# Patient Record
Sex: FEMALE | Race: WHITE | NOT HISPANIC OR LATINO
[De-identification: names, ages, dates, MRNs, and addresses within clinical notes are randomized per-mention and may not be internally consistent; named-entity substitution may affect disease eponyms.]

---

## 2017-09-06 ENCOUNTER — RESULT REVIEW (OUTPATIENT)
Age: 47
End: 2017-09-06

## 2019-05-15 DIAGNOSIS — Z12.31 ENCOUNTER FOR SCREENING MAMMOGRAM FOR MALIGNANT NEOPLASM OF BREAST: ICD-10-CM

## 2019-05-15 PROBLEM — Z00.00 ENCOUNTER FOR PREVENTIVE HEALTH EXAMINATION: Status: ACTIVE | Noted: 2019-05-15

## 2019-08-05 ENCOUNTER — RESULT REVIEW (OUTPATIENT)
Age: 49
End: 2019-08-05

## 2019-08-08 DIAGNOSIS — N63.0 UNSPECIFIED LUMP IN UNSPECIFIED BREAST: ICD-10-CM

## 2019-08-21 ENCOUNTER — RESULT REVIEW (OUTPATIENT)
Age: 49
End: 2019-08-21

## 2019-10-01 ENCOUNTER — RESULT REVIEW (OUTPATIENT)
Age: 49
End: 2019-10-01

## 2019-10-03 ENCOUNTER — RECORD ABSTRACTING (OUTPATIENT)
Age: 49
End: 2019-10-03

## 2019-10-03 DIAGNOSIS — N63.0 UNSPECIFIED LUMP IN UNSPECIFIED BREAST: ICD-10-CM

## 2019-10-03 DIAGNOSIS — Z72.89 OTHER PROBLEMS RELATED TO LIFESTYLE: ICD-10-CM

## 2019-10-03 DIAGNOSIS — Z83.3 FAMILY HISTORY OF DIABETES MELLITUS: ICD-10-CM

## 2019-10-03 DIAGNOSIS — Z80.1 FAMILY HISTORY OF MALIGNANT NEOPLASM OF TRACHEA, BRONCHUS AND LUNG: ICD-10-CM

## 2019-10-03 DIAGNOSIS — C44.90 UNSPECIFIED MALIGNANT NEOPLASM OF SKIN, UNSPECIFIED: ICD-10-CM

## 2019-10-03 DIAGNOSIS — Z82.49 FAMILY HISTORY OF ISCHEMIC HEART DISEASE AND OTHER DISEASES OF THE CIRCULATORY SYSTEM: ICD-10-CM

## 2019-10-03 DIAGNOSIS — Z80.0 FAMILY HISTORY OF MALIGNANT NEOPLASM OF DIGESTIVE ORGANS: ICD-10-CM

## 2019-10-03 DIAGNOSIS — Z85.828 PERSONAL HISTORY OF OTHER MALIGNANT NEOPLASM OF SKIN: ICD-10-CM

## 2019-10-03 DIAGNOSIS — Z80.8 FAMILY HISTORY OF MALIGNANT NEOPLASM OF OTHER ORGANS OR SYSTEMS: ICD-10-CM

## 2019-10-03 LAB — CYTOLOGY CVX/VAG DOC THIN PREP: NORMAL

## 2019-12-11 ENCOUNTER — APPOINTMENT (OUTPATIENT)
Dept: OBGYN | Facility: CLINIC | Age: 49
End: 2019-12-11
Payer: COMMERCIAL

## 2019-12-11 VITALS
SYSTOLIC BLOOD PRESSURE: 112 MMHG | DIASTOLIC BLOOD PRESSURE: 70 MMHG | BODY MASS INDEX: 22.02 KG/M2 | HEIGHT: 64 IN | WEIGHT: 129 LBS

## 2019-12-11 PROCEDURE — 99396 PREV VISIT EST AGE 40-64: CPT

## 2020-01-02 LAB
CYTOLOGY CVX/VAG DOC THIN PREP: NORMAL
HPV HIGH+LOW RISK DNA PNL CVX: NOT DETECTED

## 2020-08-13 ENCOUNTER — APPOINTMENT (OUTPATIENT)
Dept: OBGYN | Facility: CLINIC | Age: 50
End: 2020-08-13
Payer: COMMERCIAL

## 2020-08-13 VITALS
BODY MASS INDEX: 24.59 KG/M2 | DIASTOLIC BLOOD PRESSURE: 72 MMHG | SYSTOLIC BLOOD PRESSURE: 114 MMHG | WEIGHT: 144 LBS | HEIGHT: 64 IN | TEMPERATURE: 99 F

## 2020-08-13 DIAGNOSIS — Z30.431 ENCOUNTER FOR ROUTINE CHECKING OF INTRAUTERINE CONTRACEPTIVE DEVICE: ICD-10-CM

## 2020-08-13 PROCEDURE — 81025 URINE PREGNANCY TEST: CPT

## 2020-08-13 PROCEDURE — 58300 INSERT INTRAUTERINE DEVICE: CPT

## 2020-08-13 PROCEDURE — 58301 REMOVE INTRAUTERINE DEVICE: CPT

## 2020-08-14 LAB
HCG UR QL: NEGATIVE
QUALITY CONTROL: YES

## 2021-10-11 ENCOUNTER — APPOINTMENT (OUTPATIENT)
Dept: OBGYN | Facility: CLINIC | Age: 51
End: 2021-10-11
Payer: COMMERCIAL

## 2021-10-11 VITALS
WEIGHT: 140 LBS | DIASTOLIC BLOOD PRESSURE: 62 MMHG | BODY MASS INDEX: 23.9 KG/M2 | HEIGHT: 64 IN | SYSTOLIC BLOOD PRESSURE: 100 MMHG

## 2021-10-11 DIAGNOSIS — N83.202 UNSPECIFIED OVARIAN CYST, LEFT SIDE: ICD-10-CM

## 2021-10-11 PROCEDURE — 99396 PREV VISIT EST AGE 40-64: CPT

## 2021-10-11 NOTE — HISTORY OF PRESENT ILLNESS
[TextBox_4] : 50 year old pt for annual exam. No complaints today. Using Mirena IUD for contraception and happy with method; does not get periods, only inconsistent spotting. Denies changes in health since last exam. Due for mammogram. Up to date with colonoscopy. No history of cervical dysplasia. Sexually active without problems. Notes history of possible dermoid cyst that hasn't been imaged in a long time, but had episode of severe pain 2 years ago and she is afraid of recurrence. 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

## 2021-10-11 NOTE — PLAN
[FreeTextEntry1] : Rx mammogram and breast US given. Will send for TVUS to evaluate adnexa. Cervix ca screening due 2024.

## 2023-01-10 ENCOUNTER — RESULT REVIEW (OUTPATIENT)
Age: 53
End: 2023-01-10

## 2023-01-13 ENCOUNTER — NON-APPOINTMENT (OUTPATIENT)
Age: 53
End: 2023-01-13

## 2023-02-09 ENCOUNTER — RESULT REVIEW (OUTPATIENT)
Age: 53
End: 2023-02-09

## 2023-02-10 DIAGNOSIS — N64.89 OTHER SPECIFIED DISORDERS OF BREAST: ICD-10-CM

## 2023-03-07 ENCOUNTER — RESULT REVIEW (OUTPATIENT)
Age: 53
End: 2023-03-07

## 2023-03-26 ENCOUNTER — TRANSCRIPTION ENCOUNTER (OUTPATIENT)
Age: 53
End: 2023-03-26

## 2023-03-27 ENCOUNTER — NON-APPOINTMENT (OUTPATIENT)
Age: 53
End: 2023-03-27

## 2023-03-27 ENCOUNTER — APPOINTMENT (OUTPATIENT)
Dept: BREAST CENTER | Facility: CLINIC | Age: 53
End: 2023-03-27
Payer: COMMERCIAL

## 2023-03-27 DIAGNOSIS — R92.8 OTHER ABNORMAL AND INCONCLUSIVE FINDINGS ON DIAGNOSTIC IMAGING OF BREAST: ICD-10-CM

## 2023-03-27 PROCEDURE — 99204 OFFICE O/P NEW MOD 45 MIN: CPT

## 2023-03-27 NOTE — PHYSICAL EXAM
[Normocephalic] : normocephalic [Atraumatic] : atraumatic [EOMI] : extra ocular movement intact [Supple] : supple [No Supraclavicular Adenopathy] : no supraclavicular adenopathy [No Cervical Adenopathy] : no cervical adenopathy [Examined in the supine and seated position] : examined in the supine and seated position [No dominant masses] : no dominant masses in right breast  [No dominant masses] : no dominant masses left breast [No Nipple Retraction] : no left nipple retraction [No Nipple Discharge] : no left nipple discharge [Breast Mass Right Breast ___cm] : no masses [Breast Mass Left Breast ___cm] : no masses [Breast Nipple Inversion] : nipples not inverted [Breast Nipple Retraction] : nipples not retracted [Breast Nipple Flattening] : nipples not flattened [Breast Nipple Fissures] : nipples not fissured [Breast Abnormal Lactation (Galactorrhea)] : no galactorrhea [Breast Abnormal Secretion Bloody Fluid] : no bloody discharge [Breast Abnormal Secretion Serous Fluid] : no serous discharge [Breast Abnormal Secretion Opalescent Fluid] : no milky discharge [No Axillary Lymphadenopathy] : no left axillary lymphadenopathy [No Edema] : no edema [No Rashes] : no rashes [No Ulceration] : no ulceration [de-identified] : On exam, the patient has ptotic full C-cup breasts.  On palpation, she has fairly dense fibroglandular changes bilaterally with some thickening in the upper outer aspect of the right breast but no suspicious masses.  She has no axillary, supraclavicular, or cervical adenopathy.

## 2023-03-27 NOTE — ASSESSMENT
[FreeTextEntry1] : The patient is a 52-year-old G3, P3 perimenopausal white female of [place], Niuean, Slovenian, English descent.  She underwent menarche at age 14 and had her first child at age 27.  She has no family history of breast or ovarian cancer.  Her father had colon cancer in his late 50s and her mother had lung cancer at age 67.  The patient does have a history of prior breast cyst aspirations.  She underwent her routine mammography and ultrasound on January 10, 2023 and was found to have some architectural distortion seen in the posterior upper outer aspect of the right breast.  Diagnostic right breast mammography on February 9, 2023 showed this to be persistent measuring about 1.2 x 1.1 cm.  Targeted ultrasound at that time showed no ultrasound correlate.  She then underwent a right breast stereotactic core biopsy on March 7, 2023 with a "stoplight" clip placed at the biopsy site and the pathology just came back with fibroadenomatoid changes which was felt to be discordant.  I reviewed all her imaging which was performed at [place] which indeed showed this area some architectural distortion best seen on the lateral view of the right breast.  Appropriate biopsy was performed which was benign.  There is definitely some discordance with this biopsy and I agree with recommendation to move forward with surgical excision.  She understands the need for a localization device to be placed preoperatively in order to remove the area.  On my view, this is likely going to be benign but the patient understands the slight risk that this is upgraded to an early malignancy.  The patient understands the procedure and agrees to proceed as planned and I will go ahead and get this scheduled with a localization clip to be placed preoperatively.  The patient and her  were present for the discussion and all their questions were answered.

## 2023-03-27 NOTE — HISTORY OF PRESENT ILLNESS
[FreeTextEntry1] : The patient is a 52-year-old G3, P3 perimenopausal white female of [place], Rwandan, Italian, English descent.  She underwent menarche at age 14 and had her first child at age 27.  She has no family history of breast or ovarian cancer.  Her father had colon cancer in his late 50s and her mother had lung cancer at age 67.  The patient does have a history of prior breast cyst aspirations.  She underwent her routine mammography and ultrasound on January 10, 2023 and was found to have some architectural distortion seen in the posterior upper outer aspect of the right breast.  Diagnostic right breast mammography on February 9, 2023 showed this to be persistent measuring about 1.2 x 1.1 cm.  Targeted ultrasound at that time showed no ultrasound correlate.  She then underwent a right breast stereotactic core biopsy on March 7, 2023 with a "stoplight" clip placed at the biopsy site and the pathology just came back with fibroadenomatoid changes which was felt to be discordant.  She comes in now for surgical evaluation.

## 2023-03-27 NOTE — ADDENDUM
[FreeTextEntry1] : I spent greater than 75% of consultation in face-to-face counseling and coordination of care for this area of architectural distortion in the right breast with discordant on a needle core biopsy.

## 2023-03-27 NOTE — REASON FOR VISIT
[Initial Evaluation] : an initial evaluation [FreeTextEntry1] : The patient is a perimenopausal female of Djiboutian/Beninese/Cypriot/English descent with no family history of breast cancer who comes in with an area of asymmetry in the upper outer aspect of the right breast for which she underwent a stereotactic core biopsy on March 7, 2023 just showed fibroadenomatoid change but this was felt to be discordant and she comes in now for a surgical evaluation.

## 2023-04-12 ENCOUNTER — RESULT REVIEW (OUTPATIENT)
Age: 53
End: 2023-04-12

## 2023-04-17 ENCOUNTER — APPOINTMENT (OUTPATIENT)
Dept: OBGYN | Facility: CLINIC | Age: 53
End: 2023-04-17

## 2023-04-21 ENCOUNTER — RESULT REVIEW (OUTPATIENT)
Age: 53
End: 2023-04-21

## 2023-04-21 ENCOUNTER — APPOINTMENT (OUTPATIENT)
Dept: BREAST CENTER | Facility: HOSPITAL | Age: 53
End: 2023-04-21

## 2023-04-25 ENCOUNTER — NON-APPOINTMENT (OUTPATIENT)
Age: 53
End: 2023-04-25

## 2023-05-01 ENCOUNTER — APPOINTMENT (OUTPATIENT)
Dept: BREAST CENTER | Facility: CLINIC | Age: 53
End: 2023-05-01
Payer: COMMERCIAL

## 2023-05-01 VITALS
WEIGHT: 140 LBS | DIASTOLIC BLOOD PRESSURE: 72 MMHG | SYSTOLIC BLOOD PRESSURE: 110 MMHG | TEMPERATURE: 98.3 F | HEART RATE: 68 BPM | OXYGEN SATURATION: 99 % | BODY MASS INDEX: 23.9 KG/M2 | HEIGHT: 64 IN

## 2023-05-01 DIAGNOSIS — Z86.000 PERSONAL HISTORY OF IN-SITU NEOPLASM OF BREAST: ICD-10-CM

## 2023-05-01 DIAGNOSIS — N60.19 DIFFUSE CYSTIC MASTOPATHY OF UNSPECIFIED BREAST: ICD-10-CM

## 2023-05-01 DIAGNOSIS — R92.8 OTHER ABNORMAL AND INCONCLUSIVE FINDINGS ON DIAGNOSTIC IMAGING OF BREAST: ICD-10-CM

## 2023-05-01 PROCEDURE — 99024 POSTOP FOLLOW-UP VISIT: CPT

## 2023-05-01 NOTE — ASSESSMENT
[FreeTextEntry1] : The patient is a 52-year-old G3, P3 perimenopausal white female of [place], Samoan, Czech, English descent.  She underwent menarche at age 14 and had her first child at age 27.  She has no family history of breast or ovarian cancer.  Her father had colon cancer in his late 50s and her mother had lung cancer at age 67.  The patient does have a history of prior breast cyst aspirations.  She underwent her routine mammography and ultrasound on January 10, 2023 and was found to have some architectural distortion seen in the posterior upper outer aspect of the right breast.  Diagnostic right breast mammography on February 9, 2023 showed this to be persistent measuring about 1.2 x 1.1 cm.  Targeted ultrasound at that time showed no ultrasound correlate.  She then underwent a right breast stereotactic core biopsy on March 7, 2023 with a "stoplight" clip placed at the biopsy site and the pathology just came back with fibroadenomatoid changes which was felt to be discordant.  I reviewed all her imaging which was performed at [place] which indeed showed this area some architectural distortion best seen on the lateral view of the right breast.  Appropriate biopsy was performed which was benign.  I agreed with surgical excision and she underwent a localized surgical excisional biopsy with Dee localization on April 21, 2023.  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  On exam today, she is healing well from her wide excision with no evidence of infection or seroma. I went over the pathology with the patient and gave her a copy of the report for her records.  I did speak to her about risk reduction strategies due to the LCIS and her increased risk.  She was offered tamoxifen for risk reduction but after talking her about breast and side effects she decided against it.  I would like to consider an MRI sometime next year.  She was reassured and can just go back to yearly clinical follow-up.  Her next bilateral mammography and ultrasound will be due again in January 2024 and she was given prescriptions.  If that mammography and ultrasound showed no suspicious findings I would like her to get an MRI likely around July 2024.

## 2023-05-01 NOTE — PHYSICAL EXAM
[Normocephalic] : normocephalic [Atraumatic] : atraumatic [EOMI] : extra ocular movement intact [Supple] : supple [No Supraclavicular Adenopathy] : no supraclavicular adenopathy [No Cervical Adenopathy] : no cervical adenopathy [Examined in the supine and seated position] : examined in the supine and seated position [No dominant masses] : no dominant masses in right breast  [No dominant masses] : no dominant masses left breast [No Nipple Retraction] : no left nipple retraction [No Nipple Discharge] : no left nipple discharge [Breast Mass Right Breast ___cm] : no masses [Breast Mass Left Breast ___cm] : no masses [No Axillary Lymphadenopathy] : no left axillary lymphadenopathy [No Edema] : no edema [No Rashes] : no rashes [No Ulceration] : no ulceration [Breast Nipple Inversion] : nipples not inverted [Breast Nipple Retraction] : nipples not retracted [Breast Nipple Flattening] : nipples not flattened [Breast Nipple Fissures] : nipples not fissured [Breast Abnormal Lactation (Galactorrhea)] : no galactorrhea [Breast Abnormal Secretion Bloody Fluid] : no bloody discharge [Breast Abnormal Secretion Serous Fluid] : no serous discharge [Breast Abnormal Secretion Opalescent Fluid] : no milky discharge [de-identified] : On exam, the patient has ptotic full C-cup breasts.  She is healing well from her right breast surgical excisional biopsy.  She has no axillary, supraclavicular, or cervical adenopathy. [de-identified] : Status post right breast excisional biopsy.  Her wound is healing well.

## 2023-05-01 NOTE — REASON FOR VISIT
[Post Op: _________] : a [unfilled] post op visit [FreeTextEntry1] : The patient is a perimenopausal female of Luxembourger/Guamanian/Tongan/English descent with no family history of breast cancer who comes in with an area of asymmetry in the upper outer aspect of the right breast for which she underwent a stereotactic core biopsy on March 7, 2023 just showed fibroadenomatoid change but this was felt to be discordant and a right breast surgical excisional biopsy was performed with Dee localization on April 21, 2023.  She comes in now for postop follow-up..

## 2023-05-01 NOTE — HISTORY OF PRESENT ILLNESS
[FreeTextEntry1] : The patient is a 52-year-old G3, P3 perimenopausal white female of [place], Zimbabwean, Maori, English descent.  She underwent menarche at age 14 and had her first child at age 27.  She has no family history of breast or ovarian cancer.  Her father had colon cancer in his late 50s and her mother had lung cancer at age 67.  The patient does have a history of prior breast cyst aspirations.  She underwent her routine mammography and ultrasound on January 10, 2023 and was found to have some architectural distortion seen in the posterior upper outer aspect of the right breast.  Diagnostic right breast mammography on February 9, 2023 showed this to be persistent measuring about 1.2 x 1.1 cm.  Targeted ultrasound at that time showed no ultrasound correlate.  She then underwent a right breast stereotactic core biopsy on March 7, 2023 with a "stoplight" clip placed at the biopsy site and the pathology just came back with fibroadenomatoid changes which was felt to be discordant.  She then underwent a localized right breast surgical excisional biopsy on April 21, 2023.  The final pathology just showed classical type LCIS but no malignancy.  She comes in now for postop follow-up.

## 2023-07-13 ENCOUNTER — APPOINTMENT (OUTPATIENT)
Dept: OBGYN | Facility: CLINIC | Age: 53
End: 2023-07-13
Payer: COMMERCIAL

## 2023-07-13 VITALS
HEIGHT: 64 IN | SYSTOLIC BLOOD PRESSURE: 100 MMHG | BODY MASS INDEX: 25.27 KG/M2 | DIASTOLIC BLOOD PRESSURE: 60 MMHG | WEIGHT: 148 LBS

## 2023-07-13 DIAGNOSIS — R10.2 PELVIC AND PERINEAL PAIN: ICD-10-CM

## 2023-07-13 DIAGNOSIS — Z12.4 ENCOUNTER FOR SCREENING FOR MALIGNANT NEOPLASM OF CERVIX: ICD-10-CM

## 2023-07-13 DIAGNOSIS — Z01.419 ENCOUNTER FOR GYNECOLOGICAL EXAMINATION (GENERAL) (ROUTINE) W/OUT ABNORMAL FINDINGS: ICD-10-CM

## 2023-07-13 DIAGNOSIS — Z11.51 ENCOUNTER FOR SCREENING FOR HUMAN PAPILLOMAVIRUS (HPV): ICD-10-CM

## 2023-07-13 PROCEDURE — 99396 PREV VISIT EST AGE 40-64: CPT

## 2023-07-13 NOTE — HISTORY OF PRESENT ILLNESS
[FreeTextEntry1] : 52  y.o.P3 female with a PMHx abnormal right breast mammogram status post right breast biopsy and lumpectomy and dermoid cyst presents for annual GYN exam. \par Patient states she has been following with Dr. Banks and reports feeling well. \par Patient has the Mirena IUD and states she does not get her menses regularly but does have inconsistent spotting.\par She also requests a pelvic US to evaluate her dermoid cyst. Patient has not had any episodes of severe pelvic pain for the past 5 years but is concerned it may be growing. \par She denies pelvic pain, dyspareunia, abnormal vaginal discharge or urinary complaints. 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

## 2023-07-13 NOTE — PLAN
[FreeTextEntry1] : Pap smear with HPV\par TVUS for surveillance of dermoid cyst \par Follow-up in 1 year for annual appointment

## 2023-07-13 NOTE — PHYSICAL EXAM
[Chaperone Present] : A chaperone was present in the examining room during all aspects of the physical examination [FreeTextEntry1] : Maricruz  [Appropriately responsive] : appropriately responsive [Alert] : alert [No Acute Distress] : no acute distress [No Lymphadenopathy] : no lymphadenopathy [Regular Rate Rhythm] : regular rate rhythm [No Murmurs] : no murmurs [Clear to Auscultation B/L] : clear to auscultation bilaterally [Soft] : soft [Non-tender] : non-tender [Non-distended] : non-distended [No HSM] : No HSM [No Lesions] : no lesions [No Mass] : no mass [Oriented x3] : oriented x3 [No Discharge] : no discharge [No Masses] : no breast masses were palpable [Labia Majora] : normal [Labia Minora] : normal [Pink Rugae] : pink rugae [No Bleeding] : There was no active vaginal bleeding [IUD String] : an IUD string was noted [Normal] : normal [FreeTextEntry5] : no CMT  [FreeTextEntry6] : No adnexal masses or tenderness bilaterally

## 2023-07-16 LAB — HPV HIGH+LOW RISK DNA PNL CVX: NOT DETECTED

## 2023-07-18 ENCOUNTER — TRANSCRIPTION ENCOUNTER (OUTPATIENT)
Age: 53
End: 2023-07-18

## 2023-07-18 LAB — CYTOLOGY CVX/VAG DOC THIN PREP: NORMAL

## 2023-08-07 ENCOUNTER — RESULT REVIEW (OUTPATIENT)
Age: 53
End: 2023-08-07

## 2024-01-25 ENCOUNTER — RESULT REVIEW (OUTPATIENT)
Age: 54
End: 2024-01-25

## 2024-04-30 ENCOUNTER — NON-APPOINTMENT (OUTPATIENT)
Age: 54
End: 2024-04-30

## 2024-05-22 NOTE — PHYSICAL EXAM
[Normocephalic] : normocephalic [Atraumatic] : atraumatic [EOMI] : extra ocular movement intact [Supple] : supple [No Supraclavicular Adenopathy] : no supraclavicular adenopathy [No Cervical Adenopathy] : no cervical adenopathy [Examined in the supine and seated position] : examined in the supine and seated position [No dominant masses] : no dominant masses in right breast  [No dominant masses] : no dominant masses left breast [No Nipple Retraction] : no left nipple retraction [No Nipple Discharge] : no left nipple discharge [Breast Mass Right Breast ___cm] : no masses [Breast Mass Left Breast ___cm] : no masses [No Axillary Lymphadenopathy] : no left axillary lymphadenopathy [No Edema] : no edema [No Rashes] : no rashes [No Ulceration] : no ulceration [Breast Nipple Inversion] : nipples not inverted [Breast Nipple Retraction] : nipples not retracted [Breast Nipple Flattening] : nipples not flattened [Breast Nipple Fissures] : nipples not fissured [Breast Abnormal Lactation (Galactorrhea)] : no galactorrhea [Breast Abnormal Secretion Bloody Fluid] : no bloody discharge [Breast Abnormal Secretion Serous Fluid] : no serous discharge [Breast Abnormal Secretion Opalescent Fluid] : no milky discharge [de-identified] : On exam, the patient has ptotic full C-cup breasts.  She healed well from her right breast surgical excisional biopsy.  She has no axillary, supraclavicular, or cervical adenopathy. [de-identified] : History of right breast excisional biopsy

## 2024-05-22 NOTE — REASON FOR VISIT
[Follow-Up: _____] : a [unfilled] follow-up visit [FreeTextEntry1] : The patient is a perimenopausal female of Marshallese/Paraguayan/Guatemalan/English descent with no family history of breast cancer who had an area of asymmetry in the upper outer aspect of the right breast for which she underwent a stereotactic core biopsy on March 7, 2023 which showed fibroadenomatoid change but this was felt to be discordant and a right breast surgical excisional biopsy was performed with Dee localization on April 21, 2023 and final pathology just showed classical type LCIS but no malignancy.  She comes in now for routine follow-up.

## 2024-05-22 NOTE — ASSESSMENT
[FreeTextEntry1] : The patient is a 53-year-old G3, P3 perimenopausal white female of [place], Djiboutian, Divehi, English descent.  She underwent menarche at age 14 and had her first child at age 27.  She has no family history of breast or ovarian cancer.  Her father had colon cancer in his late 50s and her mother had lung cancer at age 67.  The patient does have a history of prior breast cyst aspirations.  She underwent her routine mammography and ultrasound on January 10, 2023 and was found to have some architectural distortion seen in the posterior upper outer aspect of the right breast.  Diagnostic right breast mammography on February 9, 2023 showed this to be persistent measuring about 1.2 x 1.1 cm.  Targeted ultrasound at that time showed no ultrasound correlate.  She then underwent a right breast stereotactic core biopsy on March 7, 2023 with a "stoplight" clip placed at the biopsy site and the pathology just came back with fibroadenomatoid changes which was felt to be discordant.  I reviewed all her imaging which was performed at [facility] which indeed showed this area some architectural distortion best seen on the lateral view of the right breast.  Appropriate biopsy was performed which was benign.  I agreed with surgical excision and she underwent a localized surgical excisional biopsy with Ede localization on April 21, 2023.  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 She understood her increased risk of breast cancer due to this LCIS and she was offered tamoxifen for risk reduction but after talking her about breast and side effects she decided against it.  She underwent her last bilateral mammography and ultrasound which was reviewed from January 25, 2024 performed at [facility] which showed extremely dense changes and postsurgical scarring changes in the right breast upper posterior region. On exam today, she healed well from her right breast wide excision and she has no suspicious masses. I would like her to obtain screening MRIs starting around June of this year to stagger them with her mammography and ultrasounds.  She was given a prescription and we will get preapproval and follow-up on the results.  She was reassured and as long as this MRI shows no suspicious findings, she can just follow-up in 1 year around May 2025.  Her next bilateral mammography and ultrasound will be due again in January 2025 and she was given prescriptions.  If that mammography and ultrasound shows no suspicious findings, I would like to continue staggering screening MRIs given her increased risk with the history of LCIS and her dense breast tissue.

## 2024-05-22 NOTE — HISTORY OF PRESENT ILLNESS
[FreeTextEntry1] : The patient is a 53-year-old G3, P3 perimenopausal white female of [place], Liberian, Yi, English descent.  She underwent menarche at age 14 and had her first child at age 27.  She has no family history of breast or ovarian cancer.  Her father had colon cancer in his late 50s and her mother had lung cancer at age 67.  The patient does have a history of prior breast cyst aspirations.  She underwent her routine mammography and ultrasound on January 10, 2023 and was found to have some architectural distortion seen in the posterior upper outer aspect of the right breast.  Diagnostic right breast mammography on February 9, 2023 showed this to be persistent measuring about 1.2 x 1.1 cm.  Targeted ultrasound at that time showed no ultrasound correlate.  She then underwent a right breast stereotactic core biopsy on March 7, 2023 with a "stoplight" clip placed at the biopsy site and the pathology just came back with fibroadenomatoid changes which was felt to be discordant.  She then underwent a localized right breast surgical excisional biopsy on April 21, 2023.  The final pathology just showed classical type LCIS but no malignancy.  She comes in now for routine follow-up.

## 2024-05-28 DIAGNOSIS — Z12.31 ENCOUNTER FOR SCREENING MAMMOGRAM FOR MALIGNANT NEOPLASM OF BREAST: ICD-10-CM

## 2024-05-29 ENCOUNTER — APPOINTMENT (OUTPATIENT)
Dept: BREAST CENTER | Facility: CLINIC | Age: 54
End: 2024-05-29
Payer: COMMERCIAL

## 2024-05-29 VITALS
SYSTOLIC BLOOD PRESSURE: 121 MMHG | WEIGHT: 145 LBS | DIASTOLIC BLOOD PRESSURE: 75 MMHG | HEIGHT: 65 IN | OXYGEN SATURATION: 100 % | HEART RATE: 69 BPM | BODY MASS INDEX: 24.16 KG/M2

## 2024-05-29 DIAGNOSIS — Z91.89 OTHER SPECIFIED PERSONAL RISK FACTORS, NOT ELSEWHERE CLASSIFIED: ICD-10-CM

## 2024-05-29 DIAGNOSIS — D05.01 LOBULAR CARCINOMA IN SITU OF RIGHT BREAST: ICD-10-CM

## 2024-05-29 DIAGNOSIS — N60.12 DIFFUSE CYSTIC MASTOPATHY OF LEFT BREAST: ICD-10-CM

## 2024-05-29 DIAGNOSIS — N60.11 DIFFUSE CYSTIC MASTOPATHY OF LEFT BREAST: ICD-10-CM

## 2024-05-29 DIAGNOSIS — Z12.39 ENCOUNTER FOR OTHER SCREENING FOR MALIGNANT NEOPLASM OF BREAST: ICD-10-CM

## 2024-05-29 DIAGNOSIS — R92.30 DENSE BREASTS, UNSPECIFIED: ICD-10-CM

## 2024-05-29 PROCEDURE — 99213 OFFICE O/P EST LOW 20 MIN: CPT

## 2024-05-29 NOTE — HISTORY OF PRESENT ILLNESS
[FreeTextEntry1] : The patient is a 53-year-old G3, P3 perimenopausal white female of [place], Citizen of the [place], Czech, English descent.  She underwent menarche at age 14 and had her first child at age 27.  She has no family history of breast or ovarian cancer.  Her father had colon cancer in his late 50s and her mother had lung cancer at age 67.  The patient does have a history of prior breast cyst aspirations.  She underwent her routine mammography and ultrasound on January 10, 2023 and was found to have some architectural distortion seen in the posterior upper outer aspect of the right breast.  Diagnostic right breast mammography on February 9, 2023 showed this to be persistent measuring about 1.2 x 1.1 cm.  Targeted ultrasound at that time showed no ultrasound correlate.  She then underwent a right breast stereotactic core biopsy on March 7, 2023 with a "stoplight" clip placed at the biopsy site and the pathology just came back with fibroadenomatoid changes which was felt to be discordant.  She then underwent a localized right breast surgical excisional biopsy on April 21, 2023.  The final pathology just showed classical type LCIS but no malignancy.  She comes in now for routine follow-up.

## 2024-05-29 NOTE — PHYSICAL EXAM
[Normocephalic] : normocephalic [Atraumatic] : atraumatic [EOMI] : extra ocular movement intact [Supple] : supple [No Supraclavicular Adenopathy] : no supraclavicular adenopathy [No Cervical Adenopathy] : no cervical adenopathy [Examined in the supine and seated position] : examined in the supine and seated position [No dominant masses] : no dominant masses in right breast  [No dominant masses] : no dominant masses left breast [No Nipple Retraction] : no left nipple retraction [No Nipple Discharge] : no left nipple discharge [Breast Mass Right Breast ___cm] : no masses [Breast Mass Left Breast ___cm] : no masses [No Axillary Lymphadenopathy] : no left axillary lymphadenopathy [No Edema] : no edema [No Rashes] : no rashes [No Ulceration] : no ulceration [Breast Nipple Inversion] : nipples not inverted [Breast Nipple Retraction] : nipples not retracted [Breast Nipple Flattening] : nipples not flattened [Breast Nipple Fissures] : nipples not fissured [Breast Abnormal Lactation (Galactorrhea)] : no galactorrhea [Breast Abnormal Secretion Bloody Fluid] : no bloody discharge [Breast Abnormal Secretion Serous Fluid] : no serous discharge [Breast Abnormal Secretion Opalescent Fluid] : no milky discharge [de-identified] : On exam, the patient has ptotic full C-cup breasts.  She healed well from her right breast surgical excisional biopsy.  She has no axillary, supraclavicular, or cervical adenopathy. [de-identified] : History of right breast excisional biopsy

## 2024-05-29 NOTE — REASON FOR VISIT
[Follow-Up: _____] : a [unfilled] follow-up visit [FreeTextEntry1] : The patient is a perimenopausal female of Senegalese/Malaysian/Yemeni/English descent with no family history of breast cancer who had an area of asymmetry in the upper outer aspect of the right breast for which she underwent a stereotactic core biopsy on March 7, 2023 which showed fibroadenomatoid change but this was felt to be discordant and a right breast surgical excisional biopsy was performed with Dee localization on April 21, 2023 and final pathology just showed classical type LCIS but no malignancy.  She comes in now for routine follow-up.

## 2024-05-29 NOTE — ASSESSMENT
[FreeTextEntry1] : The patient is a 53-year-old G3, P3 perimenopausal white female of [place], [place], [place], [place] descent.  She underwent menarche at age 14 and had her first child at age 27.  She has no family history of breast or ovarian cancer.  Her father had colon cancer in his late 50s and her mother had lung cancer at age 67.  The patient does have a history of prior breast cyst aspirations.  She underwent her routine mammography and ultrasound on January 10, 2023 and was found to have some architectural distortion seen in the posterior upper outer aspect of the right breast.  Diagnostic right breast mammography on February 9, 2023 showed this to be persistent measuring about 1.2 x 1.1 cm.  Targeted ultrasound at that time showed no ultrasound correlate.  She then underwent a right breast stereotactic core biopsy on March 7, 2023 with a "stoplight" clip placed at the biopsy site and the pathology just came back with fibroadenomatoid changes which was felt to be discordant.  I reviewed all her imaging which was performed at [facility] which indeed showed this area some architectural distortion best seen on the lateral view of the right breast.  Appropriate biopsy was performed which was benign.  I agreed with surgical excision and she underwent a localized surgical excisional biopsy with Dee localization on April 21, 2023.  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 She understood her increased risk of breast cancer due to this LCIS and she was offered tamoxifen for risk reduction but after talking her about breast and side effects she decided against it.  She underwent her last bilateral mammography and ultrasound which was reviewed from January 25, 2024 performed at [facility] which showed extremely dense changes and postsurgical scarring changes in the right breast upper posterior region. On exam today, she healed well from her right breast wide excision and she has no suspicious masses. I would like her to obtain screening MRIs starting around June of this year to stagger them with her mammography and ultrasounds.  She was given a prescription and we will get preapproval and follow-up on the results.  She was reassured and as long as this MRI shows no suspicious findings, she can just follow-up in 1 year around May 2025.  Her next bilateral mammography and ultrasound will be due again in January 2025 and she was given prescriptions.  If that mammography and ultrasound shows no suspicious findings, I would like to continue staggering screening MRIs given her increased risk with the history of LCIS and her dense breast tissue.

## 2024-06-26 ENCOUNTER — NON-APPOINTMENT (OUTPATIENT)
Age: 54
End: 2024-06-26

## 2024-06-27 ENCOUNTER — RESULT REVIEW (OUTPATIENT)
Age: 54
End: 2024-06-27

## 2024-07-17 ENCOUNTER — NON-APPOINTMENT (OUTPATIENT)
Age: 54
End: 2024-07-17

## 2024-07-18 ENCOUNTER — NON-APPOINTMENT (OUTPATIENT)
Age: 54
End: 2024-07-18

## 2024-07-19 ENCOUNTER — APPOINTMENT (OUTPATIENT)
Dept: OBGYN | Facility: CLINIC | Age: 54
End: 2024-07-19
Payer: COMMERCIAL

## 2024-07-19 VITALS
WEIGHT: 146 LBS | BODY MASS INDEX: 24.32 KG/M2 | SYSTOLIC BLOOD PRESSURE: 120 MMHG | DIASTOLIC BLOOD PRESSURE: 74 MMHG | HEIGHT: 65 IN

## 2024-07-19 DIAGNOSIS — Z01.419 ENCOUNTER FOR GYNECOLOGICAL EXAMINATION (GENERAL) (ROUTINE) W/OUT ABNORMAL FINDINGS: ICD-10-CM

## 2024-07-19 DIAGNOSIS — N89.8 OTHER SPECIFIED NONINFLAMMATORY DISORDERS OF VAGINA: ICD-10-CM

## 2024-07-19 PROCEDURE — 99386 PREV VISIT NEW AGE 40-64: CPT

## 2024-07-19 PROCEDURE — 99459 PELVIC EXAMINATION: CPT

## 2025-01-29 ENCOUNTER — RESULT REVIEW (OUTPATIENT)
Age: 55
End: 2025-01-29

## 2025-07-29 ENCOUNTER — NON-APPOINTMENT (OUTPATIENT)
Age: 55
End: 2025-07-29

## 2025-07-30 ENCOUNTER — APPOINTMENT (OUTPATIENT)
Dept: OBGYN | Facility: CLINIC | Age: 55
End: 2025-07-30
Payer: COMMERCIAL

## 2025-07-30 VITALS
DIASTOLIC BLOOD PRESSURE: 60 MMHG | HEIGHT: 65 IN | BODY MASS INDEX: 24.32 KG/M2 | SYSTOLIC BLOOD PRESSURE: 130 MMHG | WEIGHT: 146 LBS

## 2025-07-30 DIAGNOSIS — Z01.419 ENCOUNTER FOR GYNECOLOGICAL EXAMINATION (GENERAL) (ROUTINE) W/OUT ABNORMAL FINDINGS: ICD-10-CM

## 2025-07-30 DIAGNOSIS — Z91.89 OTHER SPECIFIED PERSONAL RISK FACTORS, NOT ELSEWHERE CLASSIFIED: ICD-10-CM

## 2025-07-30 PROCEDURE — 99396 PREV VISIT EST AGE 40-64: CPT

## 2025-07-30 PROCEDURE — 99459 PELVIC EXAMINATION: CPT
